# Patient Record
Sex: MALE | Race: WHITE | Employment: FULL TIME
[De-identification: names, ages, dates, MRNs, and addresses within clinical notes are randomized per-mention and may not be internally consistent; named-entity substitution may affect disease eponyms.]

---

## 2024-01-04 ENCOUNTER — APPOINTMENT (OUTPATIENT)
Facility: HOSPITAL | Age: 39
End: 2024-01-04
Payer: OTHER GOVERNMENT

## 2024-01-04 ENCOUNTER — HOSPITAL ENCOUNTER (EMERGENCY)
Facility: HOSPITAL | Age: 39
Discharge: HOME | End: 2024-01-04
Attending: STUDENT IN AN ORGANIZED HEALTH CARE EDUCATION/TRAINING PROGRAM
Payer: OTHER GOVERNMENT

## 2024-01-04 VITALS
WEIGHT: 232.81 LBS | OXYGEN SATURATION: 100 % | HEART RATE: 68 BPM | DIASTOLIC BLOOD PRESSURE: 100 MMHG | RESPIRATION RATE: 16 BRPM | TEMPERATURE: 98 F | HEIGHT: 70 IN | BODY MASS INDEX: 33.33 KG/M2 | SYSTOLIC BLOOD PRESSURE: 154 MMHG

## 2024-01-04 DIAGNOSIS — M79.671 ACUTE FOOT PAIN, RIGHT: Primary | ICD-10-CM

## 2024-01-04 DIAGNOSIS — S46.912A LEFT SHOULDER STRAIN, INITIAL ENCOUNTER: ICD-10-CM

## 2024-01-04 PROCEDURE — 73030 X-RAY EXAM OF SHOULDER: CPT

## 2024-01-04 PROCEDURE — 73610 X-RAY EXAM OF ANKLE: CPT

## 2024-01-04 PROCEDURE — 73630 X-RAY EXAM OF FOOT: CPT

## 2024-01-04 PROCEDURE — 99283 EMERGENCY DEPT VISIT LOW MDM: CPT

## 2024-01-04 RX ORDER — IBUPROFEN 800 MG/1
800 TABLET ORAL EVERY 8 HOURS PRN
Qty: 20 TABLET | Refills: 0 | Status: SHIPPED | OUTPATIENT
Start: 2024-01-04

## 2024-01-04 ASSESSMENT — PAIN DESCRIPTION - ORIENTATION: ORIENTATION: RIGHT

## 2024-01-04 ASSESSMENT — PAIN DESCRIPTION - LOCATION: LOCATION: FOOT

## 2024-01-04 ASSESSMENT — PAIN SCALES - GENERAL: PAINLEVEL_OUTOF10: 6

## 2024-01-04 ASSESSMENT — PAIN - FUNCTIONAL ASSESSMENT: PAIN_FUNCTIONAL_ASSESSMENT: 0-10

## 2024-01-04 NOTE — ED TRIAGE NOTES
Patient arrives to the ED via POV with complaints of R foot pain that started during physical activity yesterday. Patient also reports L shoulder pain

## 2024-01-04 NOTE — ED PROVIDER NOTES
[facility] EMERGENCY DEPT  EMERGENCY DEPARTMENT ENCOUNTER      Pt Name: Jesse Pang  MRN: [id number]  Birthdate 1985  Date of evaluation: 1/4/2024  Provider: Maxi Cardoso PA-C    CHIEF COMPLAINT       Chief Complaint   Patient presents with    Foot Pain         HISTORY OF PRESENT ILLNESS   (Location/Symptom, Timing/Onset, Context/Setting, Quality, Duration, Modifying Factors, Severity)  Note limiting factors.   39yo male, hx of HTN,  patient who presents c/o R arch of foot and plantar heel pain with L shoulder pain x yesterday. He was running and when he went to push off, wearing boots, he felt a \"pop\" in the arch of his right foot and plantar aspect of his heel. He has been limping ever since. No calf pain, numbness, tingling, posterior heel pain, ankle pain or swelling, or bruising. Fell onto his outstretched arms and L shoulder aches \"some\". No numbness, arm swelling, head injury, neck pain, back pain or other injuries sustained. Sent here by insurance when he called. No meds for pain given yet, declines offer when asked. Ambulates, weight bears to outer aspect of foot. No previous ankle / foot injury.  Took his BP meds this AM.     Combat medic, in class.    The history is provided by the patient.         Review of External Medical Records:     Nursing Notes were reviewed.    REVIEW OF SYSTEMS    (2-9 systems for level 4, 10 or more for level 5)     Review of Systems    Except as noted above the remainder of the review of systems was reviewed and negative.       PAST MEDICAL HISTORY   No past medical history on file.      SURGICAL HISTORY     No past surgical history on file.      CURRENT MEDICATIONS       Previous Medications    No medications on file       ALLERGIES     Patient has no known allergies.    FAMILY HISTORY     No family history on file.       SOCIAL HISTORY       Social History     Socioeconomic History    Marital status:            PHYSICAL EXAM    (up to 7 for level 4, 8

## 2024-01-04 NOTE — ED NOTES
Patient discharged in stable, ambulatory condition.    Patient verbalized understanding of discharge instructions.    All concerns and questions were addressed at this time.    Patient placed in short walking boot prior to leaving and understood instructions on how to put on and walk in the boot.